# Patient Record
Sex: FEMALE | Race: WHITE
[De-identification: names, ages, dates, MRNs, and addresses within clinical notes are randomized per-mention and may not be internally consistent; named-entity substitution may affect disease eponyms.]

---

## 2017-05-23 ENCOUNTER — OFFICE VISIT - HEALTHEAST (OUTPATIENT)
Dept: PODIATRY | Age: 27
End: 2017-05-23

## 2017-05-23 ENCOUNTER — OFFICE VISIT - HEALTHEAST (OUTPATIENT)
Dept: FAMILY MEDICINE | Facility: CLINIC | Age: 27
End: 2017-05-23

## 2017-05-23 DIAGNOSIS — Z01.419 WELL WOMAN EXAM WITH ROUTINE GYNECOLOGICAL EXAM: ICD-10-CM

## 2017-05-23 DIAGNOSIS — B35.1 NAIL FUNGUS: ICD-10-CM

## 2017-05-23 ASSESSMENT — MIFFLIN-ST. JEOR
SCORE: 1291.95
SCORE: 1270.4

## 2017-09-15 ENCOUNTER — COMMUNICATION - HEALTHEAST (OUTPATIENT)
Dept: FAMILY MEDICINE | Facility: CLINIC | Age: 27
End: 2017-09-15

## 2017-09-19 ENCOUNTER — OFFICE VISIT - HEALTHEAST (OUTPATIENT)
Dept: FAMILY MEDICINE | Facility: CLINIC | Age: 27
End: 2017-09-19

## 2017-09-19 ENCOUNTER — COMMUNICATION - HEALTHEAST (OUTPATIENT)
Dept: FAMILY MEDICINE | Facility: CLINIC | Age: 27
End: 2017-09-19

## 2017-09-19 DIAGNOSIS — R05.9 COUGH: ICD-10-CM

## 2017-09-19 DIAGNOSIS — Z91.09 ENVIRONMENTAL ALLERGIES: ICD-10-CM

## 2017-09-19 DIAGNOSIS — Z34.90 PREGNANCY: ICD-10-CM

## 2017-10-16 ENCOUNTER — COMMUNICATION - HEALTHEAST (OUTPATIENT)
Dept: FAMILY MEDICINE | Facility: CLINIC | Age: 27
End: 2017-10-16

## 2018-05-15 ENCOUNTER — HEALTH MAINTENANCE LETTER (OUTPATIENT)
Age: 28
End: 2018-05-15

## 2020-03-11 ENCOUNTER — HEALTH MAINTENANCE LETTER (OUTPATIENT)
Age: 30
End: 2020-03-11

## 2020-03-15 ENCOUNTER — VIRTUAL VISIT (OUTPATIENT)
Dept: FAMILY MEDICINE | Facility: OTHER | Age: 30
End: 2020-03-15

## 2020-03-16 NOTE — PROGRESS NOTES
"Date: 03/15/2020 21:14:12  Clinician: Brittni Osorio  Clinician NPI: [id number]  Patient: Isabelle Castellon  Patient : 1990  Patient Address: [id number] [place] [address]  Patient Phone: [phone]  Visit Protocol: URI  Patient Summary:  Isabelle is a 30 year old ( : 1990 ) female who initiated a Visit for cold, sinus infection, or influenza. When asked the question \"Please sign me up to receive news, health information and promotions. \", Isabelle responded \"No\".    Isabelle states her symptoms started gradually 10-13 days ago. After her symptoms started, they improved and then got worse again.   Her symptoms consist of ear pain, malaise, wheezing, a sore throat, a cough, and nasal congestion. She is experiencing mild difficulty breathing with activities but can speak normally in full sentences.   Symptom details     Nasal secretions: The color of her mucus is clear and white.    Cough: Isabelle coughs almost every minute and her cough is more bothersome at night. Phlegm comes into her throat when she coughs. She believes her cough is caused by post-nasal drip. The color of the phlegm is white and clear.     Sore throat: Isabelle reports having mild throat pain (1-3 on a 10 point pain scale), does not have exudate on her tonsils, and can swallow liquids. She is not sure if the lymph nodes in her neck are enlarged. A rash has not appeared on the skin since the sore throat started.     Wheezing: Isabelle has been diagnosed with asthma. The wheezing interferes with her normal daily activities.     Isabelle denies having fever, headache, rhinitis, facial pain or pressure, myalgias, chills, and teeth pain. She also denies having recent facial or sinus surgery in the past 60 days and taking antibiotic medication for the symptoms.   Precipitating events  Isabelle is not sure if she has been exposed to someone with strep throat. She has recently been exposed to someone with influenza. Isabelle has been in close contact with the " following high risk individuals: pregnant women and children under the age of 5.   Pertinent COVID-19 (Coronavirus) information  Isabelle has not traveled internationally or to the areas where COVID-19 (Coronavirus) is widespread in the last 14 days before the start of her symptoms.   Isabelle has not had close contact with a suspected or laboratory-confirmed COVID-19 patient within 14 days of symptom onset.   Isabelle is not a healthcare worker and does not work in a healthcare facility.   Triage Point(s) temporarily suspended for COVID-19 (Coronavirus) screening  Isabelle reported the following symptoms which were previously protocol referral points. These protocol referral points have temporarily been removed for purposes of COVID-19 (Coronavirus) screening.   Wheezing that keeps Isabelle from doing daily activities   Pertinent medical history  Isabelle does not get yeast infections when she takes antibiotics.   Isabelle does not need a return to work/school note.   Weight: 151 lbs   Isabelle does not smoke or use smokeless tobacco.   She is pregnant and denies breastfeeding.   Additional information as reported by the patient (free text): This seems to be the tail end. Mostly cough bothers me. Cough until I throw up mucus. Ear pain seems to be paired with coughing. A week ago I did have fever w/ green and yellow phlegm and couldn't hold down fluids or anything for two days. Haven't been able to have Ob appointment while sick to check on baby.   Weight: 151 lbs    MEDICATIONS: No current medications, ALLERGIES: NKDA  Clinician Response:  Dear Isabelle,  I am sorry you are not feeling well. To determine the most appropriate care for you, I would like you to be seen in person to further discuss your health history and symptoms.  You will not be charged for this Visit. Thank you for trusting us with your care.  COVID-19 (Coronavirus) General Information  With the increase in the number of COVID-19 (Coronavirus) cases, we understand you may have some  questions. Below is some helpful information on COVID-19 (Coronavirus).  How can I protect myself and others from the COVID-19 (Coronavirus)?  Because there is currently no vaccine to prevent infection, the best way to protect yourself is to avoid being exposed to this virus. Put distance between yourself and other people if COVID-19 (Coronavirus) is spreading in your community. The virus is thought to spread mainly from person-to-person.     Between people who are in close contact with one another (within about 6 about) for prolonged period (10 minutes or longer).    Through respiratory droplets produced when an infected person coughs or sneezes.     The CDC recommends the following additional steps to protect yourself and others:     Wash your hands often with soap and water for at least 20 seconds, especially after blowing your nose, coughing, or sneezing; going to the bathroom; and before eating or preparing food.  Use an alcohol-based hand  that contains at least 60 percent alcohol if soap and water are not available.        Avoid touching your eyes, nose and mouth with unwashed hands.    Avoid close contact with people who are sick.    Stay home when you are sick.    Cover your cough or sneeze with a tissue, then throw the tissue in the trash.    Clean and disinfect frequently touched objects and surfaces.     You can help stop COVID-19 (Coronavirus) by knowing the signs and symptoms:     Fever    Cough    Shortness of breath     Contact your healthcare provider if   Develop symptoms   AND   Have been in close contact with a person known to have COVID-19 (Coronavirus) or live in or have recently traveled from an area with ongoing spread of COVID-19 (Coronavirus). Call ahead before you go to a doctor's office or emergency room. Tell them about your recent travel and your symptoms.   For the most up to date information, visit the CDC's website.  Steps to help prevent the spread of COVID-19 (Coronavirus)  if you are sick  If you are sick with COVID-19 (Coronavirus) or suspect you are infected with the virus that causes COVID-19 (Coronavirus), follow the steps below to help prevent the disease from spreading&nbsp;to people in your home and community.     Stay home except to get medical care. Home isolation may be started in consultation with your healthcare clinician.    Separate yourself from other people and animals in your home.    Call ahead before visiting your doctor if you have a medical appointment.    Wear a facemask when you are around other people.    Cover your cough and sneezes.    Clean your hands often.    Avoid sharing personal household items.    Clean and disinfect frequently touched objects and surfaces everyday.    You will need to have someone drop off medications or household supplies (if needed) at your house without coming inside or in contact with you or others living in your house.    Monitor your symptoms and seek prompt medical care if your illness is worsening (e.g. Difficulty breathing).    Discontinue home isolation only in consultation with your healthcare provider.     For more detailed and up to date information on what to do if you are sick, visit this link: What to Do If You Are Sick With Coronavirus Disease 2019 (COVID-19).  Do I need to be tested for COVID-19 (Coronavirus)?     At this time, the limited number of tests available are controlled by the state and local health departments and are being reserved for more seriously ill patients, those with known exposure to confirmed patients, and those with recent travel (within 14 days) to countries with high rates of COVID-19 (Coronavirus).    Decisions on which patients receive testing will be based on the local spread of COVID-19 (Coronavirus) as well as the symptoms. Your healthcare provider will make the final decision on whether you should be tested.    In the meantime, if you have concerns that you may have been exposed, it is  "reasonable to practice \"social distancing.\"&nbsp; If you are ill with a cold or flu-like illness, please monitor your symptoms and reach out to your healthcare provider if your symptoms worsen.    For more up to date information, visit this link: COVID-19 (Coronavirus) Frequently Asked Questions and Answers.      Diagnosis: Refer for additional evaluation  Diagnosis ICD: R69  "

## 2021-01-03 ENCOUNTER — HEALTH MAINTENANCE LETTER (OUTPATIENT)
Age: 31
End: 2021-01-03

## 2021-04-25 ENCOUNTER — HEALTH MAINTENANCE LETTER (OUTPATIENT)
Age: 31
End: 2021-04-25

## 2021-05-31 VITALS — WEIGHT: 129 LBS | BODY MASS INDEX: 21.8 KG/M2

## 2021-05-31 VITALS — HEIGHT: 65 IN | BODY MASS INDEX: 20.66 KG/M2 | WEIGHT: 124 LBS

## 2021-05-31 VITALS — WEIGHT: 127 LBS | BODY MASS INDEX: 21.16 KG/M2 | HEIGHT: 65 IN

## 2021-06-10 NOTE — PROGRESS NOTES
"ASSESSMENT: Onychomycosis    PLAN: We reviewed treatment options.  She would like to hold off on oral antifungal medication because she is nursing, and contemplating another pregnancy.  She can use topical antifungal medication with the understanding that it is better at prevention than treating.  Laser is cost prohibitive.  She might consider the pill when her pregnancy days are done.      SUBJECTIVE: New patient visit at the [facility] regarding fungal bilateral first and second toes.  Discoloration and thickening have been present for a few years.  She has tried some topical antifungal treatments without much progress.  She is still nursing, and contemplating another pregnancy, and is hoping to avoid oral medication for now.  She has questions about removing the toenails.  She has not seen bleeding or drainage.  There is remote history of trauma to the right great toenail, but nothing recent.    PHYSICAL EXAM:  Pulse 84  Resp 16  Ht 5' 5\" (1.651 m)  Wt 127 lb (57.6 kg)  BMI 21.13 kg/m2  General: Pleasant 27 y.o. female in no acute distress.  Vascular: DP pulses are palpable. PT pulses are palpable. Pedal hair is present. Feet are warm to the touch.  Cardiac: Pulse is regular.  Lymphatic: No edema at the ankles.  Neuro: Sensation in the feet is grossly intact to light touch.  Derm: Bilateral great toenails are thickened and dystrophic with subungual debris.  Neighboring second toes are thickened to a lesser degree.  Lesser digits are normal.  Skin is intact without discoloration.  Musculoskeletal: Adequate passive range of motion in foot and ankle joints.    Past Medical History:   Diagnosis Date     Epistaxis      GIN (generalized anxiety disorder)         has a past surgical history that includes LASIK.    Allergies   Allergen Reactions     Ragweed Cough       Current Outpatient Prescriptions   Medication Sig Dispense Refill     PRENATAL VIT/IRON FUMARATE/FA (PRENATAL ORAL) Take 1 tablet by mouth " daily.       No current facility-administered medications for this visit.        Family History:  family history is not on file.    Social History:  Reviewed, and she reports that she has never smoked. She does not have any smokeless tobacco history on file. She reports that she does not drink alcohol.    Review of Systems:  A 12 point comprehensive review of systems was negative except as noted.

## 2021-06-10 NOTE — PROGRESS NOTES
FEMALE ADULT PREVENTIVE EXAM    CHIEF COMPLAINT:  Female preventive exam.    SUBJECTIVE:  Isabelle Castellon is a 27 y.o. female.  She last saw me Visit date not found.  She  has a past medical history of Chorioamnionitis (4/1/2016); Epistaxis; and GIN (generalized anxiety disorder).    Lab Results   Component Value Date    WBC 17.8 (H) 04/01/2016    HGB 12.0 04/01/2016    HCT 36.7 04/01/2016    MCV 92 04/01/2016     04/01/2016     No results found for: CHOL, HDL, LDLCALC, TRIG  No results found for: TSH  BP Readings from Last 3 Encounters:   05/23/17 102/78   05/13/16 108/76   04/07/16 110/70       Surgeries:    Past Surgical History:   Procedure Laterality Date     LASIK         Family History:  Her family history is not on file.    Social History:  She  reports that she has never smoked. She does not have any smokeless tobacco history on file. She reports that she does not drink alcohol.    Medications:    Current Outpatient Prescriptions:      CALCIUM CARB/VIT D3/MINERALS (CALCIUM-VITAMIN D ORAL), Take 1 tablet by mouth daily., Disp: , Rfl:      PRENATAL VIT/IRON FUMARATE/FA (PRENATAL ORAL), Take 1 tablet by mouth daily., Disp: , Rfl:   HELD MEDICATIONS: None.    Allergies:  No latex allergies.  Allergies   Allergen Reactions     Ragweed Cough       RISK BEHAVIOR & HEALTH HABITS  History of abnormal pap smear: None.  Date of previous pap smear: 3 years ago  Guns: NO  Sun Screen: YES  Dental Care: YES    REVIEW OF SYSTEMS:  Complete head to toe review of systems is otherwise negative except as above.    OBJECTIVE:  VITAL SIGNS:    Vitals:    05/23/17 1549   BP: 102/78   Pulse: 84   Resp: 14   Temp: 98.5  F (36.9  C)     GENERAL:  Patient alert, in no acute distress.  EYES: PERRLA. Extraoccular movements intact, pupils equal, reactive to light and accommodation.  Normal conjunctiva and lids.  Undilated fudoscopic exam normal, including normal size, appearance cup-to-disc ratio.  Normal posterior segments,  including no exudates or hemorrhages.  ENT:  Hearing grossly normal.  Normal appearance to ears and nose.  Bilateral TM s, external canals, oropharynx normal. Normal lips, gums and teeth.  Normal nasal mucosa, septum and turbinates.  NECK:  Supple, without thyromegaly or mass.  RESP:  Clear to auscultation without crackles, wheezes or distress.  Normal respiratory effort.   CV:  Regular rate and rhythm without murmurs, rubs or gallops.  Normal carotid, abdominal aorta, femoral and pedal pulses.  No varicosities or edema.  ABDOMEN:  Soft, non-tender, without hepatosplenomegaly, masses, or hernias.   BREASTS:  Nontender, without masses, nipple discharge, erythema, or axillary adenopathy.    :  Normal pelvic exam, including external genitalia with normal appearance and no lesions.  Normal vaginal exam, including no discharge and normal pelvic support, and no lesions.   Normal ureters, with no masses, tenerness or scarring.  Normal bladder, with no fullness, masses or tenderness.  Cervix well visualized, with normal appearance and no lesions or discharge.  Normal uterus, including normal size, shape, mobility with no tenderness.  Normal adnexa, including no nodules, masses or tenderness.  LYMPHATIC: Normal palpation of neck, groin and axilla..  No lymphadenopathy.  No bruising.  NEURO:  CN II-XII intact, motor & sensory function all intact.  DTR and reflexes normal.  PSYCHIATRIC:  Alert & oriented with normal mood and affect.  Good judgment and insight.  SKIN:  Normal inspection and palpation.  MUSCULOSKELETAL: Normal gait and station.  - Spine / Ribs / Pelvis: Normal inspection, ROM, stability and strength: Spine, Head, Neck, Upper and Lower Extremities.    ASSESSMENT & PLAN  Isabelle was seen today for annual exam.    Diagnoses and all orders for this visit:    Well woman exam with routine gynecological exam  -     Gynecologic Cytology (PAP Smear)  -Thinking about may be conceiving again in the next year or so.    Other  orders  -     Cancel: Comprehensive Metabolic Panel      General  Immunizations reviewed and updated .  Alcohol use, safety and moderation discussed.  Recommended adequate calcium intake/osteoporosis prevention.  Discussed colon cancer screening at age 50, 45 if -American.  Diet and exercise reviewed, including goal of aerobic exercise 30-90 minutes most days of the week, moderation of portions sizes, avoiding eating out and fast food and increase in fruits and vegetables.  Discussed safe sex practices.  Discussed & recommended seat belt (& motorcycle helmet) use.  Discussed & recommended smoke detector.  Discussed sun protection.  Discussed weight management.

## 2021-06-13 NOTE — PROGRESS NOTES
Previous documentation reported estimated due date of May 18, 2017.  That was my error and is corrected to reflect May 18, 2018 based on last menstrual period
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
## 2021-10-10 ENCOUNTER — HEALTH MAINTENANCE LETTER (OUTPATIENT)
Age: 31
End: 2021-10-10

## 2022-05-21 ENCOUNTER — HEALTH MAINTENANCE LETTER (OUTPATIENT)
Age: 32
End: 2022-05-21

## 2022-09-18 ENCOUNTER — HEALTH MAINTENANCE LETTER (OUTPATIENT)
Age: 32
End: 2022-09-18

## 2023-06-04 ENCOUNTER — HEALTH MAINTENANCE LETTER (OUTPATIENT)
Age: 33
End: 2023-06-04